# Patient Record
Sex: MALE | Race: WHITE | Employment: PART TIME | URBAN - METROPOLITAN AREA
[De-identification: names, ages, dates, MRNs, and addresses within clinical notes are randomized per-mention and may not be internally consistent; named-entity substitution may affect disease eponyms.]

---

## 2020-07-10 ENCOUNTER — HOSPITAL ENCOUNTER (EMERGENCY)
Facility: CLINIC | Age: 25
Discharge: HOME | End: 2020-07-10
Attending: NURSE PRACTITIONER | Admitting: NURSE PRACTITIONER
Payer: COMMERCIAL

## 2020-07-10 VITALS
BODY MASS INDEX: 25.36 KG/M2 | RESPIRATION RATE: 18 BRPM | TEMPERATURE: 97.9 F | OXYGEN SATURATION: 100 % | HEART RATE: 49 BPM | HEIGHT: 75 IN | WEIGHT: 204 LBS | DIASTOLIC BLOOD PRESSURE: 75 MMHG | SYSTOLIC BLOOD PRESSURE: 127 MMHG

## 2020-07-10 DIAGNOSIS — H53.9 VISION CHANGES: ICD-10-CM

## 2020-07-10 PROCEDURE — 99282 EMERGENCY DEPT VISIT SF MDM: CPT

## 2020-07-10 ASSESSMENT — MIFFLIN-ST. JEOR: SCORE: 1990.97

## 2020-07-10 NOTE — ED AVS SNAPSHOT
Emergency Department  [address] [place] [address]  Phone:  [phone]  Fax:  [phone]                                    Garrick Sosa   MRN: [id number]    Department:   Emergency Department   Date of Visit:  7/10/2020           After Visit Summary Signature Page    I have received my discharge instructions, and my questions have been answered. I have discussed any challenges I see with this plan with the nurse or doctor.    ..........................................................................................................................................  Patient/Patient Representative Signature      ..........................................................................................................................................  Patient Representative Print Name and Relationship to Patient    ..................................................               ................................................  Date                                   Time    ..........................................................................................................................................  Reviewed by Signature/Title    ...................................................              ..............................................  Date                                               Time          22EPIC Rev 08/18

## 2020-07-11 ASSESSMENT — ENCOUNTER SYMPTOMS
EYE DISCHARGE: 0
PHOTOPHOBIA: 0
EYE REDNESS: 0
EYE PAIN: 0

## 2020-07-11 NOTE — ED TRIAGE NOTES
Pt reports sudden onset loss of vision to left eye peripherally while playing video games. Pt reports headache currently. Patient states that when he goes to the eye doctor they always tell him he is at risk for retinal detachment d/t some abnormalities. Pt reports now his vision is back to normal.

## 2020-07-11 NOTE — ED PROVIDER NOTES
"  History     Chief Complaint:    Eye Problem       HPI   Garirck Sosa is a 24 year old male who presents with sudden loss of peripheral vision of the left eye. He was playing a video game when suddenly he noted a defined area at the periphery with loss of vision like a curtain over the outer aspect of his vision. No flashing lights. Vision returned to normal shortly after this. He denies eye pain, but does have a mild headache. He notes that his optometrist has told him he is at risk for retinal detachment based on their prior exams and that he has family members who have had detached retinas.      Allergies:  No Known Allergies     Medications:    cyclobenzaprine (FLEXERIL) 10 MG tablet  minocycline (MINOCIN,DYNACIN) 100 MG capsule        Past Medical History:    History reviewed. No pertinent past medical history.    Patient Active Problem List    Diagnosis Date Noted     Acne 04/27/2010     Priority: Medium        Past Surgical History:    History reviewed. No pertinent surgical history.     Family History:    family history includes Asthma in his mother; Cerebrovascular Disease in his paternal grandfather; Diabetes in his maternal grandfather; Hypertension in his maternal grandmother.    Social History:   reports that he has never smoked. He has never used smokeless tobacco. He reports current alcohol use. He reports previous drug use.    PCP: Randolph Loredo     Review of Systems   Eyes: Positive for visual disturbance. Negative for photophobia, pain, discharge and redness.   All other systems reviewed and are negative.        Physical Exam     Patient Vitals for the past 24 hrs:   BP Temp Temp src Pulse Heart Rate Resp SpO2 Height Weight   07/10/20 2006 127/75 -- -- -- 55 18 100 % -- --   07/10/20 1906 138/71 -- -- -- -- -- -- -- --   07/10/20 1905 -- 97.9  F (36.6  C) Temporal (!) 49 -- 15 100 % -- --   07/10/20 1904 -- -- -- -- -- -- -- 1.905 m (6' 3\") 92.5 kg (204 lb)        Physical Exam   Physical " Exam   Constitutional:  Laying in bed with eyeglasses on.   Head: Atraumatic.  Head moves freely with normal range of motion.   Eyes: Conjunctivae pink. EOMs intact. PERRL. No scleral injection. No periorbital edema or erythema.   Neck: Normal range of motion. No nuchal rigidity.   Cardiovascular: Intact distal pulses: radial pulse 2+ on the right, 2+ on the left.   Pulmonary/Chest: No respiratory distress.   Musculoskeletal: Moves all extremities.   Neurological: Oriented to person, place, and time. No focal deficits.   Skin: Skin is warm with no erythema or heat to touch.        Emergency Department Course        Emergency Department Course:  Past medical records, nursing notes, and vitals reviewed.  I performed an exam of the patient and obtained history, as documented above.    I rechecked the patient. Findings and plan explained to the Patient and his girlfriend. Patient was discharged to home.    Impression & Plan      Medical Decision Making:  Garrick Sosa is a 24 year old male with sudden onset of peripheral vision loss, which resolved spontaneously. He was told by his optometrist that because of the appearance of his retina he is at high risk for detachment. Exam here with normal appearance of eye and normal vision. He notes no current symptoms. Bedside U/S with no retinal detachment and this was confirmed by Dr Allen at the bedside. I discussed with the patient reasons to return here and need for formal Ophthalmology evaluation/exam. He and his significant other are amenable to plan.         Diagnosis:    ICD-10-CM    1. Vision changes  H53.9     resolved        Discharge Medications:     Medication List      There are no discharge medications for this visit.          7/10/2020   Nicole Mcgill, BRAD Mcgill, Nicole Wen, ROSA CNP  07/11/20 1901

## 2020-07-11 NOTE — DISCHARGE INSTRUCTIONS
Return to the ER for vision changes.     There is no evidence today for retinal detachment. I did give you some discharge instructions for retinal detachment so you can know what warning signs to watch for.

## 2020-12-06 ENCOUNTER — HEALTH MAINTENANCE LETTER (OUTPATIENT)
Age: 25
End: 2020-12-06

## 2021-09-25 ENCOUNTER — HEALTH MAINTENANCE LETTER (OUTPATIENT)
Age: 26
End: 2021-09-25

## 2022-01-15 ENCOUNTER — HEALTH MAINTENANCE LETTER (OUTPATIENT)
Age: 27
End: 2022-01-15

## 2023-01-07 ENCOUNTER — HEALTH MAINTENANCE LETTER (OUTPATIENT)
Age: 28
End: 2023-01-07

## 2023-04-22 ENCOUNTER — HEALTH MAINTENANCE LETTER (OUTPATIENT)
Age: 28
End: 2023-04-22